# Patient Record
Sex: MALE | Race: WHITE | Employment: FULL TIME | URBAN - METROPOLITAN AREA
[De-identification: names, ages, dates, MRNs, and addresses within clinical notes are randomized per-mention and may not be internally consistent; named-entity substitution may affect disease eponyms.]

---

## 2022-12-21 ENCOUNTER — HOSPITAL ENCOUNTER (EMERGENCY)
Age: 22
Discharge: HOME | End: 2022-12-21
Attending: EMERGENCY MEDICINE
Payer: COMMERCIAL

## 2022-12-21 VITALS
SYSTOLIC BLOOD PRESSURE: 151 MMHG | RESPIRATION RATE: 18 BRPM | HEART RATE: 99 BPM | TEMPERATURE: 97.9 F | BODY MASS INDEX: 26.66 KG/M2 | WEIGHT: 180 LBS | HEIGHT: 69 IN | DIASTOLIC BLOOD PRESSURE: 69 MMHG | OXYGEN SATURATION: 100 %

## 2022-12-21 DIAGNOSIS — N48.30 PRIAPISM: Primary | ICD-10-CM

## 2022-12-21 PROCEDURE — 99282 EMERGENCY DEPT VISIT SF MDM: CPT

## 2022-12-21 ASSESSMENT — ENCOUNTER SYMPTOMS
VOMITING: 0
COLOR CHANGE: 0
NAUSEA: 0
CONSTIPATION: 0
ABDOMINAL PAIN: 0
RECTAL PAIN: 0
DIARRHEA: 0
BACK PAIN: 0
SHORTNESS OF BREATH: 0

## 2022-12-21 ASSESSMENT — PAIN SCALES - GENERAL: PAINLEVEL_OUTOF10: 4

## 2022-12-21 ASSESSMENT — PAIN DESCRIPTION - LOCATION: LOCATION: GROIN

## 2022-12-21 ASSESSMENT — PAIN DESCRIPTION - DESCRIPTORS: DESCRIPTORS: ACHING

## 2022-12-21 ASSESSMENT — PAIN - FUNCTIONAL ASSESSMENT: PAIN_FUNCTIONAL_ASSESSMENT: 0-10

## 2022-12-21 NOTE — ED PROVIDER NOTES
[address] [facility] PROVIDER NOTE    Patient Identification  Pt Name: Deya Rivera  MRN: [id number]  [place] 2000  Date of evaluation: 12/21/2022  Provider: Amilcar Hernandez MD  PCP: No primary care provider on file. Chief Complaint  Groin Pain (This morning c/o of groin pain, lasting about 3 hours. Pt describes pain 4/10 nagging, aching pain. )      HPI  History provided by patient   This is a 25 y.o. male who presents to the ED for penile discomfort. No testicular pain. He woke up with an erection. He just started using a new workout medication. The erection seems to have improved. Pain is more mild now and nagging in character. No diarrhea or dysuria. No fevers. Demi Danes He estimates that his penis is only about 5% erect now    ROS  12 systems reviewed, pertinent positives/negatives per HPI otherwise noted to be negative. I have reviewed the following nursing documentation:  Allergies: Patient has no known allergies. Past medical history: History reviewed. No pertinent past medical history. Past surgical history: History reviewed. No pertinent surgical history. Home medications:   Previous Medications    No medications on file       Social history:  reports that he has never smoked. He has never used smokeless tobacco. He reports current alcohol use. Family history:  History reviewed. No pertinent family history. Exam  ED Triage Vitals [12/21/22 1046]   BP Temp Temp src Heart Rate Resp SpO2 Height Weight   (!) 151/69 97.9 °F (36.6 °C) -- 99 18 100 % 5' 9\" (1.753 m) 180 lb (81.6 kg)     Nursing note and vitals reviewed. Constitutional: In no acute distress  HENT:      Head: Normocephalic      Ears: External ears normal.      Nose: Nose normal.     Mouth: Membrane mucosa moist   Eyes: No discharge. Neck: Supple. Trachea midline. Cardiovascular: Regular rate. Warm extremities  Pulmonary/Chest: Effort normal. No respiratory distress. Abdominal: Soft.  No distension. Nontender  : No testicular pain or swelling or tenderness. No erection  Rectal: Deferred   Musculoskeletal: Moves all extremities. No gross deformity. Neurological: Alert and oriented. Face symmetric. Speech is clear. Skin: Warm and dry. Psychiatric: Normal mood and affect. Behavior is normal.    Procedures        Radiology  No orders to display       Labs  No results found for this visit on 12/21/22. Screenings   Bhargav Coma Scale  Eye Opening: Spontaneous  Best Verbal Response: Oriented  Best Motor Response: Obeys commands  Napoleon Coma Scale Score: 15       MDM and ED Course  This is a 25 y.o. male who presents to the ED for penile pain after having an erection for roughly 3 hours. And they have been longer since he woke up with it. Was taking a vasodilator workout medication and just started using it for the first time. Counseled him to not use it again. No current priapism but I do not doubt that he had it earlier. At this point there is no role for phenylephrine or aspiration. He was given recommendations to not use this medication again. He declines STD checks. Do not believe that this is torsion/UTI.          [unfilled]    Is this patient to be included in the SEP-1 Core Measure due to severe sepsis or septic shock? No   Exclusion criteria - the patient is NOT to be included for SEP-1 Core Measure due to: Infection is not suspected        Final Impression  1. Priapism        Blood pressure (!) 151/69, pulse 99, temperature 97.9 °F (36.6 °C), resp. rate 18, height 5' 9\" (1.753 m), weight 180 lb (81.6 kg), SpO2 100 %. Disposition:  DISPOSITION Decision To Discharge 12/21/2022 11:22:09 AM      Patient Referrals:  No follow-up provider specified. Discharge Medications:  New Prescriptions    No medications on file       Discontinued Medications:  Discontinued Medications    No medications on file       This chart was generated using the [address] [place] [address] [address] [place] system.  I created this record but it may contain dictation errors given the limitations of this technology.         Soy Hernandez MD  12/21/22 1121

## 2022-12-21 NOTE — ED PROVIDER NOTES
[facility]        Pt Name: Maral Antonio  MRN: [id number]  [place] 2000  Date of evaluation: 12/21/2022  Provider: Robert Nair PA-C  PCP: No primary care provider on file. Note Started: 12:06 PM EST 12/21/22           I have seen and evaluated this patient with my supervising physician No att. providers found. CHIEF COMPLAINT       Chief Complaint   Patient presents with    Groin Pain     This morning c/o of groin pain, lasting about 3 hours. Pt describes pain 4/10 nagging, aching pain. HISTORY OF PRESENT ILLNESS   (Location, Timing/Onset, Context/Setting, Quality, Duration, Modifying Factors, Severity, Associated Signs and Symptoms)  Note limiting factors. Chief Complaint: Penis erection    Maral Antonio is a 25 y.o. male who presents to the emergency department complaining of mild achy pain to the tip of his penis since waking up this morning at 7:30 AM.  He noticed an erection this morning. He has been erect since waking up this morning at 7:30 AM to his knowledge. However, states that on the way to the emergency department, his erection has improved and getting softer. He denies penile discharge, hematuria, dysuria, testicular pain or swelling, genital rash, lymphadenopathy, fever, chills, rectal pain. He denies any injury to his penis. He does not take penile enhancements. However, admittedly takes taladafil with preworkout before going to the gym. This is a vasodilator that is supposed to help with building muscle mass. Nursing Notes were all reviewed and agreed with or any disagreements were addressed in the HPI. REVIEW OF SYSTEMS    (2-9 systems for level 4, 10 or more for level 5)     Review of Systems   Constitutional:  Negative for chills and fever. Respiratory:  Negative for shortness of breath. Cardiovascular:  Negative for chest pain.    Gastrointestinal:  Negative for abdominal pain, constipation, diarrhea, nausea, rectal pain and vomiting. Endocrine: Negative. Genitourinary:  Positive for penile pain and penile swelling. Negative for decreased urine volume, difficulty urinating, dysuria, enuresis, flank pain, frequency, genital sores, hematuria, penile discharge, scrotal swelling, testicular pain and urgency. Musculoskeletal:  Negative for back pain. Skin:  Negative for color change, pallor, rash and wound. Neurological: Negative. All other systems reviewed and are negative. Positives and Pertinent negatives as per HPI. Except as noted above in the ROS, all other systems were reviewed and negative. PAST MEDICAL HISTORY   History reviewed. No pertinent past medical history. SURGICAL HISTORY   History reviewed. No pertinent surgical history. CURRENTMEDICATIONS     There are no discharge medications for this patient. ALLERGIES     Patient has no known allergies. FAMILYHISTORY     History reviewed. No pertinent family history. SOCIAL HISTORY       Social History     Tobacco Use    Smoking status: Never    Smokeless tobacco: Never   Vaping Use    Vaping Use: Some days   Substance Use Topics    Alcohol use: Yes     Comment: socially       SCREENINGS        Bhargav Coma Scale  Eye Opening: Spontaneous  Best Verbal Response: Oriented  Best Motor Response: Obeys commands  B[place]roft Coma Scale Score: 15                CIWA Assessment  BP: (!) 151/69  Heart Rate: 99             PHYSICAL EXAM    (up to 7 for level 4, 8 or more for level 5)     ED Triage Vitals [12/21/22 1046]   BP Temp Temp src Heart Rate Resp SpO2 Height Weight   (!) 151/69 97.9 °F (36.6 °C) -- 99 18 100 % 5' 9\" (1.753 m) 180 lb (81.6 kg)       Physical Exam  Vitals and nursing note reviewed. Constitutional:       Appearance: Normal appearance. He is well-developed. He is not toxic-appearing or diaphoretic. HENT:      Head: Normocephalic and atraumatic.       Right Ear: External ear normal.      Left Ear: External ear normal.      Nose: Nose normal.   Eyes:      General:         Right eye: No discharge. Left eye: No discharge. Cardiovascular:      Rate and Rhythm: Normal rate. Pulmonary:      Effort: Pulmonary effort is normal.      Breath sounds: Normal breath sounds. Abdominal:      General: Bowel sounds are normal.      Palpations: Abdomen is soft. Tenderness: There is no abdominal tenderness. There is no right CVA tenderness or left CVA tenderness. Hernia: There is no hernia in the left inguinal area or right inguinal area. Genitourinary:     Pubic Area: No rash or pubic lice. Penis: Circumcised. No phimosis, paraphimosis, hypospadias, erythema, tenderness, discharge or lesions. Penile swelling: semi-erect penis. Testes: Normal. Cremasteric reflex is present. Epididymis:      Right: Normal.      Left: Normal.   Musculoskeletal:         General: Normal range of motion. Cervical back: Normal range of motion. Lymphadenopathy:      Lower Body: No right inguinal adenopathy. No left inguinal adenopathy. Skin:     General: Skin is warm and dry. Coloration: Skin is not jaundiced or pale. Findings: No bruising, erythema, lesion or rash. Neurological:      Mental Status: He is alert and oriented to person, place, and time. Psychiatric:         Behavior: Behavior normal.       DIAGNOSTIC RESULTS   LABS:    Labs Reviewed - No data to display    When ordered only abnormal lab results are displayed. All other labs were within normal range or not returned as of this dictation. EKG: When ordered, EKG's are interpreted by the Emergency Department Physician in the absence of a cardiologist.  Please see their note for interpretation of EKG.     RADIOLOGY:   Non-plain film images such as CT, Ultrasound and MRI are read by the radiologist. Plain radiographic images are visualized and preliminarily interpreted by the ED Provider with the below findings:        Interpretation per the Radiologist below, if available at the time of this note:    No orders to display     No results found. No results found. PROCEDURES   Unless otherwise noted below, none     Procedures    CRITICAL CARE TIME   N/a    CONSULTS:  None      EMERGENCY DEPARTMENT COURSE and DIFFERENTIAL DIAGNOSIS/MDM:   Vitals:    Vitals:    12/21/22 1046   BP: (!) 151/69   Pulse: 99   Resp: 18   Temp: 97.9 °F (36.6 °C)   SpO2: 100%   Weight: 180 lb (81.6 kg)   Height: 5' 9\" (1.753 m)       Patient was given the following medications:  Medications - No data to display      Is this patient to be included in the SEP-1 Core Measure due to severe sepsis or septic shock? No   Exclusion criteria - the patient is NOT to be included for SEP-1 Core Measure due to: Infection is not suspected    This patient presents to the emergency department with improving priapism. We do not feel any intervention is necessary at this time. Patient is advised to discontinue the vasodilator drug that he takes with his preworkout. This is likely contributing to his symptoms today. He declines STD treatment or testing. Aside from the priapism,  examination is otherwise unremarkable. My suspicion is low for acute surgical abdomen, obstruction, perforation, abscess, mesenteric ischemia, AAA, dissection, cholecystitis, cholangitis, pancreatitis, appendicitis, C. diff colitis, diverticulitis, volvulus, incarcerated hernia, necrotizing fasciitis, testicular torsion, epididymitis, varicocele, hydrocele, orchitis, incarcerated hernia, Ale gangrene, pyelonephritis, perinephric abscess, kidney stone, urosepsis, fistula, intussusception, pyloric stenosis, overt STD, syphilis, balanitis, phimosis, paraphimosis, or other concerning pathology. FINAL IMPRESSION      1.  Priapism          DISPOSITION/PLAN   DISPOSITION Decision To Discharge 12/21/2022 11:22:09 AM      PATIENT REFERRED TO:  see your PCP          [facility]  [address] [facility] [place]  [phone]    If symptoms worsen    DISCHARGE MEDICATIONS:  There are no discharge medications for this patient. DISCONTINUED MEDICATIONS:  There are no discharge medications for this patient.              (Please note that portions of this note were completed with a voice recognition program.  Efforts were made to edit the dictations but occasionally words are mis-transcribed.)    Zully Davis PA-C (electronically signed)            Zully Davis PA-C  12/21/22 1211

## 2022-12-21 NOTE — DISCHARGE INSTRUCTIONS
Do not use the medication you took this morning again. If your erection returns and lasts for over 2 hours, you must return to the emergency room.   Not doing so could lead to permanent damage to your penis    If you change your mind and wish to get tested for STDs, please return here

## 2024-05-31 ENCOUNTER — HOSPITAL ENCOUNTER (EMERGENCY)
Age: 24
Discharge: LEFT BEFORE DISCHARGE | End: 2024-05-31

## 2024-05-31 VITALS
HEIGHT: 69 IN | RESPIRATION RATE: 18 BRPM | BODY MASS INDEX: 29.03 KG/M2 | DIASTOLIC BLOOD PRESSURE: 89 MMHG | TEMPERATURE: 97.2 F | SYSTOLIC BLOOD PRESSURE: 142 MMHG | HEART RATE: 92 BPM | OXYGEN SATURATION: 97 % | WEIGHT: 195.99 LBS

## 2024-05-31 ASSESSMENT — PAIN SCALES - GENERAL: PAINLEVEL_OUTOF10: 7
